# Patient Record
Sex: MALE
[De-identification: names, ages, dates, MRNs, and addresses within clinical notes are randomized per-mention and may not be internally consistent; named-entity substitution may affect disease eponyms.]

---

## 2021-01-22 ENCOUNTER — APPOINTMENT (OUTPATIENT)
Dept: UROLOGY | Facility: CLINIC | Age: 21
End: 2021-01-22
Payer: MEDICAID

## 2021-01-22 VITALS
BODY MASS INDEX: 20.66 KG/M2 | HEIGHT: 77 IN | DIASTOLIC BLOOD PRESSURE: 80 MMHG | WEIGHT: 175 LBS | TEMPERATURE: 97.8 F | SYSTOLIC BLOOD PRESSURE: 121 MMHG

## 2021-01-22 DIAGNOSIS — I86.1 SCROTAL VARICES: ICD-10-CM

## 2021-01-22 DIAGNOSIS — Z78.9 OTHER SPECIFIED HEALTH STATUS: ICD-10-CM

## 2021-01-22 DIAGNOSIS — K46.9 UNSPECIFIED ABDOMINAL HERNIA W/OUT OBSTRUCTION OR GANGRENE: ICD-10-CM

## 2021-01-22 PROCEDURE — 99204 OFFICE O/P NEW MOD 45 MIN: CPT

## 2021-01-22 PROCEDURE — 99072 ADDL SUPL MATRL&STAF TM PHE: CPT

## 2021-01-22 NOTE — HISTORY OF PRESENT ILLNESS
[None] : no symptoms [FreeTextEntry1] : Patient\par Job: student\par Partner Name: Deya\par Partner Age: 23 \par Prior marriage: none\par Prior Pregnancy none\par Duration of Relationship: 3\par Years unprotected sexual intercourse: 3 years\par Time [place]: 3 years\par Sexual dysfunction: none\par Artificial lubricants: none \par  [Erectile Dysfunction] : no Erectile Dysfunction

## 2021-01-22 NOTE — ASSESSMENT
[FreeTextEntry1] : Mr. 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  He has been previously seen by Dr. Bates.  He was found to have a varicocele and a large hernia and underwent microsurgical varicocelectomy (6.2019).  On examination he has no evidence of a varicocele at this time.  He has no hydrocele.\par Outside records reviewed\par Outside lab reviwed\par   2 semen analyses are available.  Analysis on December 24, 2020 demonstrated severe oligospermia with an occasional sperm noted.  The semen volume is normal.  The morphology is abnormal (1%) the motility is 33%.  Analysis in November 2020 demonstrated 0.3 million sperm per cc again with 33% motility and 1% normal morphology.  Endocrinologic evaluation is available.  His testosterone was noted to be 1047.  His LH and FSH prolactin are normal.\par It does not appear that he has had genetic studies..  He was found to have a varicocele and a large hernia and underwent microsurgical varicocelectomy.  On examination he has no evidence of a varicocele at this time.  He has no hydrocele.  2 semen analyses are available.  Analysis on December 24, 2020 demonstrated severe oligospermia with an occasional sperm noted.  The semen volume is normal.  The morphology is abnormal (1%) the motility is 33%.  Analysis in November 2020 demonstrated 0.3 million sperm per cc again with 33% motility and 1% normal morphology.  Endocrinologic evaluation is available.  His testosterone was noted to be 1047.  His LH and FSH prolactin are normal.\par It does not appear that he has had genetic studies.\par Discussed proceeding with IVF; couple has seen Dr KAYE Baird\par Needs repeat T/Estrogen studies\par Discussed genetics studies\par followup to review\par

## 2021-01-22 NOTE — PHYSICAL EXAM
[Exaggerated Use Of Accessory Muscles For Inspiration] : no accessory muscle use [] : no hepato-splenomegaly [Urethral Meatus] : meatus normal [Penis Abnormality] : normal circumcised penis [Epididymis] : the epididymides were normal [Testes Tenderness] : no tenderness of the testes [Testes Mass (___cm)] : there were no testicular masses [FreeTextEntry1] : well healed inguinal incision; no varicocele by PE or doppler

## 2021-02-19 ENCOUNTER — APPOINTMENT (OUTPATIENT)
Dept: UROLOGY | Facility: CLINIC | Age: 21
End: 2021-02-19
Payer: MEDICAID

## 2021-02-19 VITALS — TEMPERATURE: 98.1 F

## 2021-02-19 DIAGNOSIS — E28.0 ESTROGEN EXCESS: ICD-10-CM

## 2021-02-19 DIAGNOSIS — N46.8 OTHER MALE INFERTILITY: ICD-10-CM

## 2021-02-19 PROCEDURE — 99213 OFFICE O/P EST LOW 20 MIN: CPT

## 2021-02-19 PROCEDURE — 99072 ADDL SUPL MATRL&STAF TM PHE: CPT

## 2021-02-19 NOTE — HISTORY OF PRESENT ILLNESS
[FreeTextEntry1] : Patient\par Job: student\par Partner Name: Deya\par Partner Age: 23 \par Prior marriage: none\par Prior Pregnancy none\par Duration of Relationship: 3\par Years unprotected sexual intercourse: 3 years\par Time [place]: 3 years\par Sexual dysfunction: none\par Artificial lubricants: none \par \par 2.19.2021\par returns ro reviiew and outline management\par

## 2021-02-19 NOTE — ASSESSMENT
[FreeTextEntry1] : Mr. 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  He has been previously seen by Dr. Bates.  He was found to have a varicocele and a large hernia and underwent microsurgical varicocelectomy (6.2019).  On examination he has no evidence of a varicocele at this time.  He has no hydrocele.\par Outside records reviewed\par Outside lab reviwed\par   2 semen analyses are available.  Analysis on December 24, 2020 demonstrated severe oligospermia with an occasional sperm noted.  The semen volume is normal.  The morphology is abnormal (1%) the motility is 33%.  Analysis in November 2020 demonstrated 0.3 million sperm per cc again with 33% motility and 1% normal morphology.  Endocrinologic evaluation is available.  His testosterone was noted to be 1047.  His LH and FSH prolactin are normal.\par It does not appear that he has had genetic studies..  He was found to have a varicocele and a large hernia and underwent microsurgical varicocelectomy.  On examination he has no evidence of a varicocele at this time.  He has no hydrocele.  2 semen analyses are available.  Analysis on December 24, 2020 demonstrated severe oligospermia with an occasional sperm noted.  The semen volume is normal.  The morphology is abnormal (1%) the motility is 33%.  Analysis in November 2020 demonstrated 0.3 million sperm per cc again with 33% motility and 1% normal morphology.  Endocrinologic evaluation is available.  His testosterone was noted to be 1047.  His LH and FSH prolactin are normal.\par It does not appear that he has had genetic studies.\par Discussed proceeding with IVF; couple has seen Dr KAYE Baird\par Needs repeat T/Estrogen studies\par Discussed genetics studies\par followup to review\par \par 2.19.2021\par meeting with couple\par testosterone 1000\par free testosterone 12.5\par y chromsome - NO deletion\par karyotype normal\par estrogen 140 (total)\par discussed T and estrogen metabolism\par discussed potential causes elevated T and Estrogen\par explained further evaluation and potential intervention\par discussed based upon current studies IVF likely to be needed\par wiill proceed with:\par T free T\par estradiol\par total estrogen\par SHBG\par Liver function\par cbc\par thyroid\par discussed potential hormonal manipulation\par The [facility]  expressed fully understanding of the information provided, the consequences and the management.\par

## 2021-03-05 ENCOUNTER — APPOINTMENT (OUTPATIENT)
Dept: UROLOGY | Facility: CLINIC | Age: 21
End: 2021-03-05
Payer: MEDICAID

## 2021-03-05 DIAGNOSIS — Z00.00 ENCOUNTER FOR GENERAL ADULT MEDICAL EXAMINATION W/OUT ABNORMAL FINDINGS: ICD-10-CM

## 2021-03-05 DIAGNOSIS — E29.0 TESTICULAR HYPERFUNCTION: ICD-10-CM

## 2021-03-05 DIAGNOSIS — E34.9 ENDOCRINE DISORDER, UNSPECIFIED: ICD-10-CM

## 2021-03-05 DIAGNOSIS — N46.01 ORGANIC AZOOSPERMIA: ICD-10-CM

## 2021-03-05 LAB
ALBUMIN SERPL ELPH-MCNC: 4.9 G/DL
ALP BLD-CCNC: 58 U/L
ALT SERPL-CCNC: 18 U/L
ANION GAP SERPL CALC-SCNC: 15 MMOL/L
AST SERPL-CCNC: 21 U/L
BASOPHILS # BLD AUTO: 0.03 K/UL
BASOPHILS NFR BLD AUTO: 0.8 %
BILIRUB SERPL-MCNC: 0.5 MG/DL
BUN SERPL-MCNC: 17 MG/DL
CALCIUM SERPL-MCNC: 10.1 MG/DL
CHLORIDE SERPL-SCNC: 102 MMOL/L
CO2 SERPL-SCNC: 23 MMOL/L
CREAT SERPL-MCNC: 0.98 MG/DL
EOSINOPHIL # BLD AUTO: 0.05 K/UL
EOSINOPHIL NFR BLD AUTO: 1.3 %
ESTRADIOL SERPL-MCNC: 20 PG/ML
ESTROGEN SERPL-MCNC: 69 PG/ML
FSH SERPL-MCNC: 1.1 IU/L
GLUCOSE SERPL-MCNC: 82 MG/DL
HCT VFR BLD CALC: 46.6 %
HGB BLD-MCNC: 15.1 G/DL
IMM GRANULOCYTES NFR BLD AUTO: 0.3 %
LYMPHOCYTES # BLD AUTO: 1.06 K/UL
LYMPHOCYTES NFR BLD AUTO: 28 %
MAN DIFF?: NORMAL
MCHC RBC-ENTMCNC: 27 PG
MCHC RBC-ENTMCNC: 32.4 GM/DL
MCV RBC AUTO: 83.4 FL
MONOCYTES # BLD AUTO: 0.39 K/UL
MONOCYTES NFR BLD AUTO: 10.3 %
NEUTROPHILS # BLD AUTO: 2.25 K/UL
NEUTROPHILS NFR BLD AUTO: 59.3 %
PLATELET # BLD AUTO: 210 K/UL
POTASSIUM SERPL-SCNC: 4.4 MMOL/L
PROT SERPL-MCNC: 7.3 G/DL
RBC # BLD: 5.59 M/UL
RBC # FLD: 13.4 %
SHBG-ESOTERIX: 54.3 NMOL/L
SODIUM SERPL-SCNC: 140 MMOL/L
TSH SERPL-ACNC: 1.75 UIU/ML
WBC # FLD AUTO: 3.79 K/UL

## 2021-03-05 PROCEDURE — 99214 OFFICE O/P EST MOD 30 MIN: CPT

## 2021-03-05 PROCEDURE — 99072 ADDL SUPL MATRL&STAF TM PHE: CPT

## 2021-03-05 RX ORDER — FOLLITROPIN ALFA 450 UNIT
450 KIT SUBCUTANEOUS
Qty: 12 | Refills: 0 | Status: ACTIVE | COMMUNITY
Start: 2021-03-05 | End: 1900-01-01

## 2021-03-05 NOTE — ASSESSMENT
[FreeTextEntry1] : Mr. 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  He has been previously seen by Dr. Bates.  He was found to have a varicocele and a large hernia and underwent microsurgical varicocelectomy (6.2019).  On examination he has no evidence of a varicocele at this time.  He has no hydrocele.\par Outside records reviewed\par Outside lab reviwed\par   2 semen analyses are available.  Analysis on December 24, 2020 demonstrated severe oligospermia with an occasional sperm noted.  The semen volume is normal.  The morphology is abnormal (1%) the motility is 33%.  Analysis in November 2020 demonstrated 0.3 million sperm per cc again with 33% motility and 1% normal morphology.  Endocrinologic evaluation is available.  His testosterone was noted to be 1047.  His LH and FSH prolactin are normal.\par It does not appear that he has had genetic studies..  He was found to have a varicocele and a large hernia and underwent microsurgical varicocelectomy.  On examination he has no evidence of a varicocele at this time.  He has no hydrocele.  2 semen analyses are available.  Analysis on December 24, 2020 demonstrated severe oligospermia with an occasional sperm noted.  The semen volume is normal.  The morphology is abnormal (1%) the motility is 33%.  Analysis in November 2020 demonstrated 0.3 million sperm per cc again with 33% motility and 1% normal morphology.  Endocrinologic evaluation is available.  His testosterone was noted to be 1047.  His LH and FSH prolactin are normal.\par It does not appear that he has had genetic studies.\par Discussed proceeding with IVF; couple has seen Dr KAYE Baird\par Needs repeat T/Estrogen studies\par Discussed genetics studies\par followup to review\par \par 2.19.2021\par meeting with couple\par testosterone 1000\par free testosterone 12.5\par y chromsome - NO deletion\par karyotype normal\par estrogen 140 (total)\par discussed T and estrogen metabolism\par discussed potential causes elevated T and Estrogen\par explained further evaluation and potential intervention\par discussed based upon current studies IVF likely to be needed\par wiill proceed with:\par T free T\par estradiol\par total estrogen\par SHBG\par Liver function\par cbc\par thyroid\par discussed potential hormonal manipulation\par The [facility]  expressed fully understanding of the information provided, the consequences and the management.\par \par 3.5.2021\par returns to review results and options\par SHBG 54 .3 (normla)\par estrogen total 69 (normal)\par LFTs normal\par TSH normal\par FSH 1.1 (low ) had been low on prior determination\par willl repeat testosterone, estradiol\par discussed potential significance\par discussed options:\par 1.IVF\par 2. gonal F to improve spermatogenesis\par discussed utilization  units TIW\par discussed time requirement for procedure\par \par Plan \par 1. education FSH\par 2. intiate therapy\par 3. fu in 3 weeks\par

## 2021-03-18 ENCOUNTER — NON-APPOINTMENT (OUTPATIENT)
Age: 21
End: 2021-03-18